# Patient Record
Sex: FEMALE | Race: WHITE | URBAN - METROPOLITAN AREA
[De-identification: names, ages, dates, MRNs, and addresses within clinical notes are randomized per-mention and may not be internally consistent; named-entity substitution may affect disease eponyms.]

---

## 2019-06-07 ENCOUNTER — OFFICE VISIT (OUTPATIENT)
Dept: URBAN - METROPOLITAN AREA CLINIC 33 | Facility: CLINIC | Age: 69
End: 2019-06-07
Payer: MEDICARE

## 2019-06-07 DIAGNOSIS — H43.813 BILATERAL VITREOUS DETACHMENT OF EYES: ICD-10-CM

## 2019-06-07 DIAGNOSIS — H31.092 OTHER CHORIORETINAL SCARS, LEFT EYE: Primary | ICD-10-CM

## 2019-06-07 PROCEDURE — 92004 COMPRE OPH EXAM NEW PT 1/>: CPT | Performed by: OPTOMETRIST

## 2019-06-07 ASSESSMENT — INTRAOCULAR PRESSURE
OS: 13
OD: 12

## 2019-06-07 NOTE — IMPRESSION/PLAN
Impression: Bilateral vitreous detachment of eyes: H43.813. - floaters Plan: Posterior vitreous detachment accounts for the patient's complaints. There is no evidence of retinal pathology. All signs and risks of retinal detachment and tears were discussed in detail. Patient instructed to call the office immediately if any symptoms noted. Recommend the patient return to office for follow up.

## 2019-06-07 NOTE — IMPRESSION/PLAN
Impression: Other chorioretinal scars, left eye: H31.092. Plan: Discussed diagnosis in detail with patient. No treatment is required at this time.

## 2020-07-06 ENCOUNTER — OFFICE VISIT (OUTPATIENT)
Dept: URBAN - METROPOLITAN AREA CLINIC 33 | Facility: CLINIC | Age: 70
End: 2020-07-06
Payer: MEDICARE

## 2020-07-06 DIAGNOSIS — H00.025 HORDEOLUM INTERNUM LEFT LOWER EYELID: ICD-10-CM

## 2020-07-06 PROCEDURE — 92012 INTRM OPH EXAM EST PATIENT: CPT | Performed by: OPTOMETRIST

## 2020-07-06 ASSESSMENT — INTRAOCULAR PRESSURE
OS: 13
OD: 13

## 2020-09-08 ENCOUNTER — OFFICE VISIT (OUTPATIENT)
Dept: URBAN - METROPOLITAN AREA CLINIC 33 | Facility: CLINIC | Age: 70
End: 2020-09-08
Payer: MEDICARE

## 2020-09-08 DIAGNOSIS — H11.32 CONJUNCTIVAL HEMORRHAGE, LEFT EYE: Primary | ICD-10-CM

## 2020-09-08 PROCEDURE — 99212 OFFICE O/P EST SF 10 MIN: CPT | Performed by: OPTOMETRIST

## 2020-09-08 ASSESSMENT — INTRAOCULAR PRESSURE
OD: 10
OS: 10

## 2020-09-08 NOTE — IMPRESSION/PLAN
Impression: Conjunctival hemorrhage, left eye: H11.32. Plan: Conjunctival hemorrhage is largely a benign finding caused by pooling of blood underneath the conjunctiva from the rupture of small capillary vessels. This is commonly caused by valsalva maneuver, sneezing, coughing, or any form of strenuous activity. Cold compresses and AT's have been recommended to patient for relief of irritation but hemorrhage may take several days or weeks to resolve. It is not recommended that patient discontinue any blood thinners if prescribed by other managing physicians.

## 2022-08-15 ENCOUNTER — OFFICE VISIT (OUTPATIENT)
Dept: URBAN - METROPOLITAN AREA CLINIC 33 | Facility: CLINIC | Age: 72
End: 2022-08-15
Payer: MEDICARE

## 2022-08-15 DIAGNOSIS — H25.13 AGE-RELATED NUCLEAR CATARACT, BILATERAL: ICD-10-CM

## 2022-08-15 DIAGNOSIS — H43.813 VITREOUS DEGENERATION, BILATERAL: ICD-10-CM

## 2022-08-15 DIAGNOSIS — H04.123 DRY EYE SYNDROME OF BILATERAL LACRIMAL GLANDS: Primary | ICD-10-CM

## 2022-08-15 PROCEDURE — 99214 OFFICE O/P EST MOD 30 MIN: CPT | Performed by: OPTOMETRIST

## 2022-08-15 ASSESSMENT — INTRAOCULAR PRESSURE
OS: 19
OD: 18

## 2022-08-15 NOTE — IMPRESSION/PLAN
Impression: Age-related nuclear cataract, bilateral: H25.13. Plan: Cataracts account for the patient's complaints. No treatment currently recommended. The patient will monitor vision changes and contact us with any decrease in vision. Patient is satisfied with current VA's with glasses, continue with spec Rx.

## 2022-08-15 NOTE — IMPRESSION/PLAN
Impression: Vitreous degeneration, bilateral: H43.813. Plan: Condition is long-standing and well tolerated. No retinal tears or detachment. No treatment needed.

## 2022-08-15 NOTE — IMPRESSION/PLAN
Impression: Dry eye syndrome of bilateral lacrimal glands: H04.123. Plan: Discussed discharge in the morning after being asleep. Start AT's BID OU. Patient denies symptoms other than discharge she sees in the mirror but is not red or irritated.

## 2023-07-25 ENCOUNTER — OFFICE VISIT (OUTPATIENT)
Dept: URBAN - METROPOLITAN AREA CLINIC 33 | Facility: CLINIC | Age: 73
End: 2023-07-25
Payer: MEDICARE

## 2023-07-25 DIAGNOSIS — H31.092 OTHER CHORIORETINAL SCARS, LEFT EYE: ICD-10-CM

## 2023-07-25 DIAGNOSIS — H25.13 AGE-RELATED NUCLEAR CATARACT, BILATERAL: Primary | ICD-10-CM

## 2023-07-25 DIAGNOSIS — H52.4 PRESBYOPIA: ICD-10-CM

## 2023-07-25 DIAGNOSIS — H04.123 DRY EYE SYNDROME OF BILATERAL LACRIMAL GLANDS: ICD-10-CM

## 2023-07-25 PROCEDURE — 99214 OFFICE O/P EST MOD 30 MIN: CPT

## 2023-07-25 ASSESSMENT — VISUAL ACUITY
OS: 20/20
OD: 20/20

## 2023-07-25 ASSESSMENT — INTRAOCULAR PRESSURE
OS: 13
OD: 13